# Patient Record
Sex: MALE | Race: BLACK OR AFRICAN AMERICAN
[De-identification: names, ages, dates, MRNs, and addresses within clinical notes are randomized per-mention and may not be internally consistent; named-entity substitution may affect disease eponyms.]

---

## 2018-06-13 ENCOUNTER — HOSPITAL ENCOUNTER (EMERGENCY)
Dept: HOSPITAL 72 - EMR | Age: 63
Discharge: ANOTHER HOSPITAL | End: 2018-06-13
Payer: COMMERCIAL

## 2018-06-13 VITALS — SYSTOLIC BLOOD PRESSURE: 118 MMHG | DIASTOLIC BLOOD PRESSURE: 82 MMHG

## 2018-06-13 VITALS — DIASTOLIC BLOOD PRESSURE: 82 MMHG | SYSTOLIC BLOOD PRESSURE: 118 MMHG

## 2018-06-13 VITALS — HEIGHT: 78 IN | WEIGHT: 195 LBS | BODY MASS INDEX: 22.56 KG/M2

## 2018-06-13 DIAGNOSIS — Z96.651: ICD-10-CM

## 2018-06-13 DIAGNOSIS — L02.415: Primary | ICD-10-CM

## 2018-06-13 DIAGNOSIS — I10: ICD-10-CM

## 2018-06-13 LAB
ADD MANUAL DIFF: NO
ALBUMIN SERPL-MCNC: 2.6 G/DL (ref 3.4–5)
ALBUMIN/GLOB SERPL: 0.4 {RATIO} (ref 1–2.7)
ALP SERPL-CCNC: 65 U/L (ref 46–116)
ALT SERPL-CCNC: 33 U/L (ref 12–78)
ANION GAP SERPL CALC-SCNC: 8 MMOL/L (ref 5–15)
APTT BLD: 33 SEC (ref 23–33)
AST SERPL-CCNC: 31 U/L (ref 15–37)
BASOPHILS NFR BLD AUTO: 0.8 % (ref 0–2)
BILIRUB SERPL-MCNC: 0.4 MG/DL (ref 0.2–1)
BUN SERPL-MCNC: 20 MG/DL (ref 7–18)
CALCIUM SERPL-MCNC: 9.1 MG/DL (ref 8.5–10.1)
CHLORIDE SERPL-SCNC: 103 MMOL/L (ref 98–107)
CK MB SERPL-MCNC: 1.5 NG/ML (ref 0–3.6)
CK SERPL-CCNC: 89 U/L (ref 26–308)
CO2 SERPL-SCNC: 26 MMOL/L (ref 21–32)
CREAT SERPL-MCNC: 1.2 MG/DL (ref 0.55–1.3)
EOSINOPHIL NFR BLD AUTO: 4.6 % (ref 0–3)
ERYTHROCYTE [DISTWIDTH] IN BLOOD BY AUTOMATED COUNT: 13.3 % (ref 11.6–14.8)
GLOBULIN SER-MCNC: 6.2 G/DL
HCT VFR BLD CALC: 32.2 % (ref 42–52)
HGB BLD-MCNC: 10.2 G/DL (ref 14.2–18)
INR PPP: 1 (ref 0.9–1.1)
LYMPHOCYTES NFR BLD AUTO: 28.1 % (ref 20–45)
MCV RBC AUTO: 94 FL (ref 80–99)
MONOCYTES NFR BLD AUTO: 12 % (ref 1–10)
NEUTROPHILS NFR BLD AUTO: 54.5 % (ref 45–75)
PLATELET # BLD: 286 K/UL (ref 150–450)
POTASSIUM SERPL-SCNC: 3.3 MMOL/L (ref 3.5–5.1)
RBC # BLD AUTO: 3.43 M/UL (ref 4.7–6.1)
SODIUM SERPL-SCNC: 137 MMOL/L (ref 136–145)
WBC # BLD AUTO: 6.4 K/UL (ref 4.8–10.8)

## 2018-06-13 PROCEDURE — 83605 ASSAY OF LACTIC ACID: CPT

## 2018-06-13 PROCEDURE — 85610 PROTHROMBIN TIME: CPT

## 2018-06-13 PROCEDURE — 80053 COMPREHEN METABOLIC PANEL: CPT

## 2018-06-13 PROCEDURE — 86900 BLOOD TYPING SEROLOGIC ABO: CPT

## 2018-06-13 PROCEDURE — 96365 THER/PROPH/DIAG IV INF INIT: CPT

## 2018-06-13 PROCEDURE — 73701 CT LOWER EXTREMITY W/DYE: CPT

## 2018-06-13 PROCEDURE — 87040 BLOOD CULTURE FOR BACTERIA: CPT

## 2018-06-13 PROCEDURE — 93005 ELECTROCARDIOGRAM TRACING: CPT

## 2018-06-13 PROCEDURE — 85651 RBC SED RATE NONAUTOMATED: CPT

## 2018-06-13 PROCEDURE — 84484 ASSAY OF TROPONIN QUANT: CPT

## 2018-06-13 PROCEDURE — 99285 EMERGENCY DEPT VISIT HI MDM: CPT

## 2018-06-13 PROCEDURE — 82553 CREATINE MB FRACTION: CPT

## 2018-06-13 PROCEDURE — 86901 BLOOD TYPING SEROLOGIC RH(D): CPT

## 2018-06-13 PROCEDURE — 85025 COMPLETE CBC W/AUTO DIFF WBC: CPT

## 2018-06-13 PROCEDURE — 96374 THER/PROPH/DIAG INJ IV PUSH: CPT

## 2018-06-13 PROCEDURE — 36415 COLL VENOUS BLD VENIPUNCTURE: CPT

## 2018-06-13 PROCEDURE — 83880 ASSAY OF NATRIURETIC PEPTIDE: CPT

## 2018-06-13 PROCEDURE — 85730 THROMBOPLASTIN TIME PARTIAL: CPT

## 2018-06-13 PROCEDURE — 86850 RBC ANTIBODY SCREEN: CPT

## 2018-06-13 PROCEDURE — 96375 TX/PRO/DX INJ NEW DRUG ADDON: CPT

## 2018-06-13 PROCEDURE — 86140 C-REACTIVE PROTEIN: CPT

## 2018-06-13 PROCEDURE — 71045 X-RAY EXAM CHEST 1 VIEW: CPT

## 2018-06-13 PROCEDURE — 73562 X-RAY EXAM OF KNEE 3: CPT

## 2018-06-13 PROCEDURE — 82550 ASSAY OF CK (CPK): CPT

## 2018-06-13 NOTE — EMERGENCY ROOM REPORT
History of Present Illness


General


Chief Complaint:  Skin Rash/Abscess


Source:  Patient





Present Illness


HPI


63-year-old male with hypertension right knee replacement 2016 presenting with 

swollen knee pain and bump onto his knee.  States that the swelling started to 

occur 6 days ago.  Also developed a large bump to his right knee that has some 

purulent drainage.  Denies any fever chills.  States is that this is her 

happened before.  Does doctor yesterday who told him to come to the emergency 

room


Allergies:  


Coded Allergies:  


     No Known Allergies (Unverified , 6/13/18)





Patient History


Past Medical History:  see triage record


Past Surgical History:  none


Pertinent Family History:  none


Reviewed Nursing Documentation:  PMH: Agreed; PSxH: Agreed





Nursing Documentation-PMH


Hx Hypertension:  Yes





Review of Systems


All Other Systems:  negative except mentioned in HPI





Physical Exam





Vital Signs








  Date Time  Temp Pulse Resp B/P (MAP) Pulse Ox O2 Delivery O2 Flow Rate FiO2


 


6/13/18 17:04 98.3 80 17 126/81 99 Room Air  





 98.2       








Sp02 EP Interpretation:  reviewed, normal


General Appearance:  alert, GCS 15, moderate distress


Head:  normocephalic, atraumatic


Eyes:  bilateral eye normal inspection, bilateral eye PERRL, bilateral eye EOMI


ENT:  normal ENT inspection, normal pharynx, normal voice, moist mucus membranes


Neck:  normal inspection, full range of motion, supple


Respiratory:  normal inspection, lungs clear, normal breath sounds, no 

respiratory distress, no retraction, no wheezing, speaking full sentences, 

chest symmetrical


Cardiovascular #1:  normal inspection, regular rate, rhythm, no edema, normal 

capillary refill


Cardiovascular #2:  2+ radial (R), 2+ radial (L)


Gastrointestinal:  normal inspection, non tender, soft, non-distended, no 

guarding


Genitourinary:  no CVA tenderness


Musculoskeletal:  other - Right knee is swollen, tender, however does has full 

range of motion of knee, anterior knee is a large 5 x 5 fluctuant mass, no 

current purulent drainage


Neurologic:  normal inspection, alert, oriented x3, responsive, motor strength/

tone normal, sensory intact, normal gait, speech normal


Psychiatric:  normal inspection, judgement/insight normal, memory normal


Skin:  normal inspection, normal color, no rash, warm/dry, well hydrated, 

normal turgor





Medical Decision Making


Diagnostic Impression:  


 Primary Impression:  


 Abscess of knee, right


ER Course


63-year-old male with right knee pain and swelling with a history of right knee 

replacement





DDX:


Right knee appears extremely erythematous with an overlying abscess, possible 

infection also into the knee joint also infecting all the hardware





Plan:


Obtain labs, ua, ucx, CXR, EKG


Orthopedic consult, vancomycin, IV fluids





ER course:


Patient has remained stable during ED stay. Received abx


I spoke with , states that at [facility] they do not do 

these to surgeries because this would be an extensive surgery requiring 

washouts as well as removal and replacement of knee hardware.


Spoke with Dr. Trivedi, from [facility], patient will be 

transferred due to insurance purposes.  I also let him know that he will 

require continuation of IV antibiotics and orthopedic consult for possible 

washout.  He verbalized understanding





Disposition:


Pt to be xferred to [facility] due to insurance purposes and will require ortho 

consult there. 


Pt stable for xfer





Please note that this Emergency Department Report was dictated using [email or web address] technology software, occasionally this can lead to 

erroneous entry secondary to interpretation by the dictation equipment





EKG Diagnostic Results


EP Interpretation: Yes


Rate: normal


Rhythm: NSR


ST Segments: No acute changes 


ASA given to patient: No





Rhythm Strip


EP Interpretation: Yes


Rate: 90


Rhythm: NSR, no PVCs, no ectopy





Xray: R knee


Complete


Indication: Pain


EP Interpretation: Yes


Interpretation: hardware noted with knee replacement, also with soft tissue 

swelling, no obvious effusion


Impression: No acute disease





Electronically signed by Erin Rodriguez MD





Laboratory Tests








Test


  6/13/18


17:40


 


White Blood Count


  6.4 K/UL


(4.8-10.8)


 


Red Blood Count


  3.43 M/UL


(4.70-6.10)  L


 


Hemoglobin


  10.2 G/DL


(14.2-18.0)  L


 


Hematocrit


  32.2 %


(42.0-52.0)  L


 


Mean Corpuscular Volume 94 FL (80-99)  


 


Mean Corpuscular Hemoglobin


  29.9 PG


(27.0-31.0)


 


Mean Corpuscular Hemoglobin


Concent 31.8 G/DL


(32.0-36.0)  L


 


Red Cell Distribution Width


  13.3 %


(11.6-14.8)


 


Platelet Count


  286 K/UL


(150-450)


 


Mean Platelet Volume


  5.4 FL


(6.5-10.1)  L


 


Neutrophils (%) (Auto)


  54.5 %


(45.0-75.0)


 


Lymphocytes (%) (Auto)


  28.1 %


(20.0-45.0)


 


Monocytes (%) (Auto)


  12.0 %


(1.0-10.0)  H


 


Eosinophils (%) (Auto)


  4.6 %


(0.0-3.0)  H


 


Basophils (%) (Auto)


  0.8 %


(0.0-2.0)


 


Erythrocyte Sedimentation Rate


  104 MM/HR


(0-20)  H


 


Prothrombin Time


  10.8 SEC


(9.30-11.50)


 


Prothrombin Time INR 1.0 (0.9-1.1)  


 


PTT


  33 SEC (23-33)


 


 


Sodium Level


  137 MMOL/L


(136-145)


 


Potassium Level


  3.3 MMOL/L


(3.5-5.1)  L


 


Chloride Level


  103 MMOL/L


()


 


Carbon Dioxide Level


  26 MMOL/L


(21-32)


 


Anion Gap


  8 mmol/L


(5-15)


 


Blood Urea Nitrogen


  20 mg/dL


(7-18)  H


 


Creatinine


  1.2 MG/DL


(0.55-1.30)


 


Estimate Glomerular


Filtration Rate > 60 mL/min


(>60)


 


Glucose Level


  103 MG/DL


()


 


Lactic Acid Level


  1.10 mmol/L


(0.4-2.0)


 


Calcium Level


  9.1 MG/DL


(8.5-10.1)


 


Total Bilirubin


  0.4 MG/DL


(0.2-1.0)


 


Aspartate Amino Transferase


(AST) 31 U/L (15-37)


 


 


Alanine Aminotransferase (ALT)


  33 U/L (12-78)


 


 


Alkaline Phosphatase


  65 U/L


()


 


Total Creatine Kinase


  89 U/L


()


 


Creatine Kinase MB


  1.5 NG/ML


(0.0-3.6)


 


Creatine Kinase MB Relative


Index 1.6  


 


 


Troponin I


  0.000 ng/mL


(0.000-0.056)


 


C-Reactive Protein,


Quantitative 5.6 mg/dL


(0.00-0.90)  H


 


Pro-B-Type Natriuretic Peptide


  173 pg/mL


(0-125)  H


 


Total Protein


  8.8 G/DL


(6.4-8.2)  H


 


Albumin


  2.6 G/DL


(3.4-5.0)  L


 


Globulin 6.2 g/dL  


 


Albumin/Globulin Ratio


  0.4 (1.0-2.7)


L











Last Vital Signs








  Date Time  Temp Pulse Resp B/P (MAP) Pulse Ox O2 Delivery O2 Flow Rate FiO2


 


6/13/18 17:04 98.3 80 17 126/81 99 Room Air  





 98.2       








Disposition:  XFER SHT-TRM HOSP


Condition:  Serious











RetinoErin M.D. Jun 13, 2018 18:01

## 2018-06-14 NOTE — DIAGNOSTIC IMAGING REPORT
Indication: Knee pain

 

Technique: 3 views of the right knee

 

Comparison: None

 

Findings: There is a right knee prosthesis. There is extensive chronic appearing

destructive and proliferative change of the distal femur and proximal tibia. There is

also chronic appearing destructive change of the patella. Heterotopic ossification is

seen surrounding the knee joint and distal femur. There is evidence of some swelling

of the joint. No periprosthetic lucency demonstrated.

 

Impression: Postsurgical right knee, as described

 

Advanced destructive changes of the surrounding bones. This appears most likely

chronic, but superimposed acute infection cannot be excluded

 

No periprosthetic lucency to suggest prosthesis infection or loosening

## 2018-06-14 NOTE — DIAGNOSTIC IMAGING REPORT
Indication: Right knee pain, fluctuant mass anterior to the knee

 

Technique: No IV contrast utilized.  Spiral acquisitions obtained through the right

knee   Multiplanar  reconstructions were generated. Total dose length product 484

mGycm.  CTDIvol(s) 15 mGy. Radiation dose was minimized using automated exposure

control

 

Comparison: Plain radiographs earlier the same day

 

Findings: There is considerable image degradation due to streak artifact from the

right knee prosthesis. Anterior to the upper tibial stem, there is suggestion of a

collection which is immediately anterior to the surface of the bone, deep to the

subcutaneous fat, measures approximately 6 6.8 cm transverse by 3.5 cm AP. More

cephalad, this process is completely obscured by streak artifact. Above the denser

part of the femoral prosthesis, this is not visualized, but there is thickening of

the anterior tissues and edema of the surrounding fat.

 

There is loss of the anterior cortex of the femur anterior to the distal stem which

appears chronic. There is considerable proliferative new bone surrounding the

prosthesis. There is questionable lucency surrounding the cement bone interface of

the tibial stem, which is more apparent than on the plain radiographs, and some

erosion of the lateral cortex. There is considerable soft tissue thickening of the

synovium, but no definite joint effusion. There is also thickening of the soft

tissues anterior and surrounding the distal femur, with dystrophic calcification

present. There is irregular periosteal thickening somewhat diffusely, and chronic

appearing destructive changes of the distal femur, proximal tibia, and to a lesser

extent the fibular head, better visualized on the plain radiographs. Chronic

appearing destructive changes of the patella on plain radiographs are not well

demonstrated on CT due to the streak artifact.

 

Impression: Right knee prosthesis in place, as described

 

Possible fluid collection seen anteriorly to the proximal tibia, only a small portion

of which is visualized due to obscuration by streak artifact. Given stated clinical

history, this could represent and abscess. Ultrasound may be useful for better

visualization. This was not described in the [facility] preliminary report. Discrepancy

has been reported to [facility]. This finding is reportedly evident clinically, per the

electronic medical record

 

Questionable lucency about the distal cement bone interface, with some erosion of the

adjacent tibial cortex. This could represent particle disease. Infection not

excludable

 

Other extensive destructive changes of the distal fibula, proximal tibia, and femur

as described on plain radiograph, appearance mostly suggestive of chronic process.

Uncertain whether this represents chronic changes related to the prosthesis, active

quiescent destructive change, or could represent changes preceding the surgery.

Correlation with any prior outside radiographs and may be available would be useful

 

Synovial thickening without definite effusion

 

No evidence of fracture

 

Remainder of the findings agree with the preliminary interpretation provided

overnight by [facility] teleradiology service.

 

The CT scanner at [facility] is accredited by the [employer]

Radiology and the scans are performed using protocols designed to limit radiation

exposure to as low as reasonably achievable to attain images of sufficient resolution

adequate for diagnostic evaluation.

## 2018-06-16 NOTE — CARDIOLOGY REPORT
--------------- APPROVED REPORT --------------





EKG Measurement

Heart [id number]

[id number]

[id number]

[id number]

[id number]





Normal sinus rhythm

Normal ECG

## 2020-07-21 ENCOUNTER — HOSPITAL ENCOUNTER (EMERGENCY)
Dept: HOSPITAL 87 - ER | Age: 65
Discharge: HOME | End: 2020-07-21
Payer: MEDICARE

## 2020-07-21 VITALS — HEIGHT: 75 IN | BODY MASS INDEX: 22.48 KG/M2 | WEIGHT: 180.78 LBS

## 2020-07-21 VITALS — SYSTOLIC BLOOD PRESSURE: 112 MMHG | DIASTOLIC BLOOD PRESSURE: 73 MMHG

## 2020-07-21 DIAGNOSIS — T50.7X1A: Primary | ICD-10-CM

## 2020-07-21 DIAGNOSIS — R41.82: ICD-10-CM
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
## 2020-07-21 DIAGNOSIS — Z85.46: ICD-10-CM

## 2020-07-21 DIAGNOSIS — C22.8: ICD-10-CM

## 2020-07-21 DIAGNOSIS — R00.0: ICD-10-CM

## 2020-07-21 LAB
CHLORIDE SERPL-SCNC: 103 MEQ/L (ref 98–107)
EOSINOPHIL NFR BLD MANUAL: 2 % (ref 0–5)
ERYTHROCYTE [DISTWIDTH] IN BLOOD BY AUTOMATED COUNT: 18.9 % (ref 11.6–14.6)
ETHANOL SERPL-MCNC: < 10 MG/DL
HCT VFR BLD AUTO: 25.7 % (ref 42–52)
HGB BLD-MCNC: 8.5 G/DL (ref 14–18)
INR PPP: 1.2
LYMPHOCYTES NFR BLD MANUAL: 14 % (ref 20–50)
MCH RBC QN AUTO: 32.5 PG (ref 28–32)
MCV RBC AUTO: 98.8 FL (ref 80–94)
METAMYELOCYTES NFR BLD MANUAL: 2 % (ref 0–0)
MONOCYTES NFR BLD MANUAL: 6 % (ref 2–8)
NEUTS SEG NFR BLD MANUAL: 75 % (ref 45–75)
PLATELET # BLD AUTO: 536 X1000/UL (ref 130–400)
PLATELET # BLD EST: (no result) 10*3/UL
PMV BLD AUTO: 8.2 FL (ref 7.4–10.4)
PROTHROMBIN TIME: 12.5 SEC (ref 9.6–11)
RBC # BLD AUTO: 2.6 MILL/UL (ref 4.7–6.1)
VARIANT LYMPHS NFR BLD MANUAL: 1 %

## 2020-07-21 PROCEDURE — 36415 COLL VENOUS BLD VENIPUNCTURE: CPT

## 2020-07-21 PROCEDURE — 80329 ANALGESICS NON-OPIOID 1 OR 2: CPT

## 2020-07-21 PROCEDURE — 99284 EMERGENCY DEPT VISIT MOD MDM: CPT

## 2020-07-21 PROCEDURE — 96361 HYDRATE IV INFUSION ADD-ON: CPT

## 2020-07-21 PROCEDURE — 80320 DRUG SCREEN QUANTALCOHOLS: CPT

## 2020-07-21 PROCEDURE — 96375 TX/PRO/DX INJ NEW DRUG ADDON: CPT

## 2020-07-21 PROCEDURE — 85025 COMPLETE CBC W/AUTO DIFF WBC: CPT

## 2020-07-21 PROCEDURE — 83690 ASSAY OF LIPASE: CPT

## 2020-07-21 PROCEDURE — 85610 PROTHROMBIN TIME: CPT

## 2020-07-21 PROCEDURE — G0480 DRUG TEST DEF 1-7 CLASSES: HCPCS

## 2020-07-21 PROCEDURE — 93005 ELECTROCARDIOGRAM TRACING: CPT

## 2020-07-21 PROCEDURE — 80307 DRUG TEST PRSMV CHEM ANLYZR: CPT

## 2020-07-21 PROCEDURE — 82140 ASSAY OF AMMONIA: CPT

## 2020-07-21 PROCEDURE — 80053 COMPREHEN METABOLIC PANEL: CPT

## 2020-07-21 PROCEDURE — 96374 THER/PROPH/DIAG INJ IV PUSH: CPT

## 2020-08-02 ENCOUNTER — HOSPITAL ENCOUNTER (INPATIENT)
Dept: HOSPITAL 87 - ER | Age: 65
LOS: 2 days | Discharge: HOSPICE | DRG: 279 | End: 2020-08-04
Attending: INTERNAL MEDICINE | Admitting: INTERNAL MEDICINE
Payer: MEDICARE

## 2020-08-02 VITALS — SYSTOLIC BLOOD PRESSURE: 125 MMHG | DIASTOLIC BLOOD PRESSURE: 77 MMHG

## 2020-08-02 VITALS — WEIGHT: 187.02 LBS | HEIGHT: 74 IN | BODY MASS INDEX: 24 KG/M2

## 2020-08-02 DIAGNOSIS — B19.20: ICD-10-CM

## 2020-08-02 DIAGNOSIS — E87.1: ICD-10-CM

## 2020-08-02 DIAGNOSIS — E43: ICD-10-CM

## 2020-08-02 DIAGNOSIS — R18.8: ICD-10-CM

## 2020-08-02 DIAGNOSIS — C78.00: ICD-10-CM

## 2020-08-02 DIAGNOSIS — N17.9: ICD-10-CM

## 2020-08-02 DIAGNOSIS — C61: ICD-10-CM

## 2020-08-02 DIAGNOSIS — R74.0: ICD-10-CM

## 2020-08-02 DIAGNOSIS — Z79.899: ICD-10-CM

## 2020-08-02 DIAGNOSIS — C78.7: ICD-10-CM

## 2020-08-02 DIAGNOSIS — Z60.2: ICD-10-CM

## 2020-08-02 DIAGNOSIS — D64.9: ICD-10-CM

## 2020-08-02 DIAGNOSIS — E87.5: ICD-10-CM

## 2020-08-02 DIAGNOSIS — K76.7: Primary | ICD-10-CM

## 2020-08-02 LAB
AMPHETAMINES UR QL SCN: NEGATIVE
APPEARANCE UR: CLEAR
BARBITURATES UR QL SCN: NEGATIVE
BENZODIAZ UR QL SCN: NEGATIVE
BZE UR QL SCN: NEGATIVE
CANNABINOIDS UR QL SCN: NEGATIVE
CHLORIDE SERPL-SCNC: 99 MEQ/L (ref 98–107)
COLOR UR: (no result)
EOSINOPHIL NFR BLD MANUAL: 1 % (ref 0–5)
ERYTHROCYTE [DISTWIDTH] IN BLOOD BY AUTOMATED COUNT: 17.1 % (ref 11.6–14.6)
ETHANOL SERPL-MCNC: < 10 MG/DL
HCT VFR BLD AUTO: 25.2 % (ref 42–52)
HGB BLD-MCNC: 8.8 G/DL (ref 14–18)
HGB UR QL STRIP: NEGATIVE
INR PPP: 1.2
KETONES UR STRIP-MCNC: NEGATIVE MG/DL
LEUKOCYTE ESTERASE UR QL STRIP: (no result)
LYMPHOCYTES NFR BLD MANUAL: 18 % (ref 20–50)
MCH RBC QN AUTO: 34 PG (ref 28–32)
MCV RBC AUTO: 97 FL (ref 80–94)
METHADONE UR QL SCN: NEGATIVE
MONOCYTES NFR BLD MANUAL: 8 % (ref 2–8)
NEUTS SEG NFR BLD MANUAL: 73 % (ref 45–75)
NITRITE UR QL STRIP: NEGATIVE
OPIATES UR QL SCN: (no result)
PCP UR QL SCN: NEGATIVE
PH UR STRIP: 5 [PH] (ref 4.5–8)
PLATELET # BLD AUTO: 647 X1000/UL (ref 130–400)
PLATELET # BLD EST: (no result) 10*3/UL
PMV BLD AUTO: 8.4 FL (ref 7.4–10.4)
PROT UR QL STRIP: (no result)
PROTHROMBIN TIME: 12.3 SEC (ref 9.6–11)
RBC # BLD AUTO: 2.6 MILL/UL (ref 4.7–6.1)
SP GR UR STRIP: 1.02 (ref 1–1.03)
UROBILINOGEN UR STRIP-MCNC: 0.2 E.U./DL (ref 0.2–1)

## 2020-08-02 PROCEDURE — 99291 CRITICAL CARE FIRST HOUR: CPT

## 2020-08-02 PROCEDURE — 85025 COMPLETE CBC W/AUTO DIFF WBC: CPT

## 2020-08-02 PROCEDURE — 82140 ASSAY OF AMMONIA: CPT

## 2020-08-02 PROCEDURE — 80305 DRUG TEST PRSMV DIR OPT OBS: CPT

## 2020-08-02 PROCEDURE — 96365 THER/PROPH/DIAG IV INF INIT: CPT

## 2020-08-02 PROCEDURE — 80048 BASIC METABOLIC PNL TOTAL CA: CPT

## 2020-08-02 PROCEDURE — 80320 DRUG SCREEN QUANTALCOHOLS: CPT

## 2020-08-02 PROCEDURE — 80053 COMPREHEN METABOLIC PANEL: CPT

## 2020-08-02 PROCEDURE — 71045 X-RAY EXAM CHEST 1 VIEW: CPT

## 2020-08-02 PROCEDURE — 81003 URINALYSIS AUTO W/O SCOPE: CPT

## 2020-08-02 PROCEDURE — 93005 ELECTROCARDIOGRAM TRACING: CPT

## 2020-08-02 PROCEDURE — 36415 COLL VENOUS BLD VENIPUNCTURE: CPT

## 2020-08-02 PROCEDURE — 49083 ABD PARACENTESIS W/IMAGING: CPT

## 2020-08-02 PROCEDURE — 82962 GLUCOSE BLOOD TEST: CPT

## 2020-08-02 PROCEDURE — 84484 ASSAY OF TROPONIN QUANT: CPT

## 2020-08-02 PROCEDURE — G0480 DRUG TEST DEF 1-7 CLASSES: HCPCS

## 2020-08-02 PROCEDURE — 74176 CT ABD & PELVIS W/O CONTRAST: CPT

## 2020-08-02 SDOH — SOCIAL STABILITY - SOCIAL INSECURITY: PROBLEMS RELATED TO LIVING ALONE: Z60.2

## 2020-08-03 VITALS — DIASTOLIC BLOOD PRESSURE: 74 MMHG | SYSTOLIC BLOOD PRESSURE: 120 MMHG

## 2020-08-03 VITALS — SYSTOLIC BLOOD PRESSURE: 108 MMHG | DIASTOLIC BLOOD PRESSURE: 62 MMHG

## 2020-08-03 VITALS — DIASTOLIC BLOOD PRESSURE: 69 MMHG | SYSTOLIC BLOOD PRESSURE: 110 MMHG

## 2020-08-03 VITALS — DIASTOLIC BLOOD PRESSURE: 69 MMHG | SYSTOLIC BLOOD PRESSURE: 112 MMHG

## 2020-08-03 VITALS — DIASTOLIC BLOOD PRESSURE: 63 MMHG | SYSTOLIC BLOOD PRESSURE: 108 MMHG

## 2020-08-03 VITALS — SYSTOLIC BLOOD PRESSURE: 105 MMHG | DIASTOLIC BLOOD PRESSURE: 67 MMHG

## 2020-08-03 VITALS — SYSTOLIC BLOOD PRESSURE: 111 MMHG | DIASTOLIC BLOOD PRESSURE: 68 MMHG

## 2020-08-03 VITALS — SYSTOLIC BLOOD PRESSURE: 116 MMHG | DIASTOLIC BLOOD PRESSURE: 70 MMHG

## 2020-08-03 VITALS — DIASTOLIC BLOOD PRESSURE: 73 MMHG | SYSTOLIC BLOOD PRESSURE: 113 MMHG

## 2020-08-03 VITALS — DIASTOLIC BLOOD PRESSURE: 60 MMHG | SYSTOLIC BLOOD PRESSURE: 103 MMHG

## 2020-08-03 VITALS — DIASTOLIC BLOOD PRESSURE: 68 MMHG | SYSTOLIC BLOOD PRESSURE: 124 MMHG

## 2020-08-03 VITALS — SYSTOLIC BLOOD PRESSURE: 102 MMHG | DIASTOLIC BLOOD PRESSURE: 64 MMHG

## 2020-08-03 LAB
CHLORIDE SERPL-SCNC: 102 MEQ/L (ref 98–107)
EOSINOPHIL NFR BLD MANUAL: 1 % (ref 0–5)
ERYTHROCYTE [DISTWIDTH] IN BLOOD BY AUTOMATED COUNT: 16.8 % (ref 11.6–14.6)
HCT VFR BLD AUTO: 23.4 % (ref 42–52)
HGB BLD-MCNC: 8.2 G/DL (ref 14–18)
LYMPHOCYTES NFR BLD MANUAL: 13 % (ref 20–50)
MCH RBC QN AUTO: 33.8 PG (ref 28–32)
MCV RBC AUTO: 96.8 FL (ref 80–94)
MONOCYTES NFR BLD MANUAL: 8 % (ref 2–8)
NEUTS SEG NFR BLD MANUAL: 78 % (ref 45–75)
PLATELET # BLD AUTO: 575 X1000/UL (ref 130–400)
PLATELET # BLD EST: (no result) 10*3/UL
PMV BLD AUTO: 8.2 FL (ref 7.4–10.4)
RBC # BLD AUTO: 2.42 MILL/UL (ref 4.7–6.1)

## 2020-08-03 PROCEDURE — 0W9G3ZZ DRAINAGE OF PERITONEAL CAVITY, PERCUTANEOUS APPROACH: ICD-10-PCS | Performed by: RADIOLOGY

## 2020-08-03 RX ADMIN — HYDROMORPHONE HYDROCHLORIDE PRN MG: 2 INJECTION, SOLUTION INTRAMUSCULAR; INTRAVENOUS; SUBCUTANEOUS at 22:35

## 2020-08-03 RX ADMIN — POTASSIUM ACETATE SCH MLS/HR: 3.93 INJECTION, SOLUTION, CONCENTRATE INTRAVENOUS at 00:26

## 2020-08-03 RX ADMIN — Medication PRN MG: at 06:33

## 2020-08-03 RX ADMIN — HYDROCODONE BITARTRATE AND ACETAMINOPHEN PRN TAB: 10; 325 TABLET ORAL at 02:34

## 2020-08-03 RX ADMIN — POTASSIUM ACETATE SCH MLS/HR: 3.93 INJECTION, SOLUTION, CONCENTRATE INTRAVENOUS at 10:51

## 2020-08-03 RX ADMIN — HYDROCODONE BITARTRATE AND ACETAMINOPHEN PRN TAB: 10; 325 TABLET ORAL at 06:38

## 2020-08-03 RX ADMIN — DEXTROSE SCH MLS/HR: 5 SOLUTION INTRAVENOUS at 14:05

## 2020-08-03 RX ADMIN — HYDROMORPHONE HYDROCHLORIDE PRN MG: 2 INJECTION, SOLUTION INTRAMUSCULAR; INTRAVENOUS; SUBCUTANEOUS at 15:59

## 2020-08-03 RX ADMIN — MORPHINE SULFATE SCH MG: 15 TABLET, EXTENDED RELEASE ORAL at 20:23

## 2020-08-03 RX ADMIN — HYDROCODONE BITARTRATE AND ACETAMINOPHEN PRN TAB: 10; 325 TABLET ORAL at 14:07

## 2020-08-03 RX ADMIN — MORPHINE SULFATE SCH MG: 15 TABLET, EXTENDED RELEASE ORAL at 08:58

## 2020-08-04 VITALS — SYSTOLIC BLOOD PRESSURE: 104 MMHG | DIASTOLIC BLOOD PRESSURE: 67 MMHG

## 2020-08-04 VITALS — SYSTOLIC BLOOD PRESSURE: 104 MMHG | DIASTOLIC BLOOD PRESSURE: 64 MMHG

## 2020-08-04 VITALS — SYSTOLIC BLOOD PRESSURE: 106 MMHG | DIASTOLIC BLOOD PRESSURE: 62 MMHG

## 2020-08-04 VITALS — SYSTOLIC BLOOD PRESSURE: 111 MMHG | DIASTOLIC BLOOD PRESSURE: 64 MMHG

## 2020-08-04 VITALS — SYSTOLIC BLOOD PRESSURE: 101 MMHG | DIASTOLIC BLOOD PRESSURE: 59 MMHG

## 2020-08-04 VITALS — DIASTOLIC BLOOD PRESSURE: 64 MMHG | SYSTOLIC BLOOD PRESSURE: 102 MMHG

## 2020-08-04 VITALS — SYSTOLIC BLOOD PRESSURE: 109 MMHG | DIASTOLIC BLOOD PRESSURE: 67 MMHG

## 2020-08-04 VITALS — SYSTOLIC BLOOD PRESSURE: 105 MMHG | DIASTOLIC BLOOD PRESSURE: 67 MMHG

## 2020-08-04 VITALS — DIASTOLIC BLOOD PRESSURE: 70 MMHG | SYSTOLIC BLOOD PRESSURE: 109 MMHG

## 2020-08-04 VITALS — SYSTOLIC BLOOD PRESSURE: 107 MMHG | DIASTOLIC BLOOD PRESSURE: 67 MMHG

## 2020-08-04 LAB
CHLORIDE SERPL-SCNC: 100 MEQ/L (ref 98–107)
ERYTHROCYTE [DISTWIDTH] IN BLOOD BY AUTOMATED COUNT: 16.9 % (ref 11.6–14.6)
HCT VFR BLD AUTO: 25 % (ref 42–52)
HGB BLD-MCNC: 8.3 G/DL (ref 14–18)
LYMPHOCYTES NFR BLD MANUAL: 9 % (ref 20–50)
MCH RBC QN AUTO: 32.6 PG (ref 28–32)
MCV RBC AUTO: 97.6 FL (ref 80–94)
MONOCYTES NFR BLD MANUAL: 6 % (ref 2–8)
NEUTS SEG NFR BLD MANUAL: 85 % (ref 45–75)
PLATELET # BLD AUTO: 599 X1000/UL (ref 130–400)
PLATELET # BLD EST: (no result) 10*3/UL
PMV BLD AUTO: 8.7 FL (ref 7.4–10.4)
RBC # BLD AUTO: 2.56 MILL/UL (ref 4.7–6.1)

## 2020-08-04 RX ADMIN — HYDROCODONE BITARTRATE AND ACETAMINOPHEN PRN TAB: 10; 325 TABLET ORAL at 06:02

## 2020-08-04 RX ADMIN — HYDROMORPHONE HYDROCHLORIDE PRN MG: 2 INJECTION, SOLUTION INTRAMUSCULAR; INTRAVENOUS; SUBCUTANEOUS at 11:39

## 2020-08-04 RX ADMIN — HYDROMORPHONE HYDROCHLORIDE PRN MG: 2 INJECTION, SOLUTION INTRAMUSCULAR; INTRAVENOUS; SUBCUTANEOUS at 02:43

## 2020-08-04 RX ADMIN — Medication PRN MG: at 00:46

## 2020-08-04 RX ADMIN — DEXTROSE SCH MLS/HR: 5 SOLUTION INTRAVENOUS at 05:56

## 2020-08-04 RX ADMIN — HYDROCODONE BITARTRATE AND ACETAMINOPHEN PRN TAB: 10; 325 TABLET ORAL at 00:59

## 2020-08-04 RX ADMIN — MORPHINE SULFATE SCH MG: 15 TABLET, EXTENDED RELEASE ORAL at 08:25
